# Patient Record
Sex: FEMALE | Race: WHITE | Employment: FULL TIME | URBAN - NONMETROPOLITAN AREA
[De-identification: names, ages, dates, MRNs, and addresses within clinical notes are randomized per-mention and may not be internally consistent; named-entity substitution may affect disease eponyms.]

---

## 2017-09-12 ENCOUNTER — HOSPITAL ENCOUNTER (OUTPATIENT)
Dept: MAMMOGRAPHY | Age: 62
Discharge: HOME | End: 2017-09-12
Payer: COMMERCIAL

## 2017-09-12 DIAGNOSIS — Z12.31 ENCOUNTER FOR MAMMOGRAM TO ESTABLISH BASELINE MAMMOGRAM: ICD-10-CM

## 2017-09-12 PROCEDURE — G0202 SCR MAMMO BI INCL CAD: HCPCS

## 2018-10-30 ENCOUNTER — HOSPITAL ENCOUNTER (OUTPATIENT)
Dept: MAMMOGRAPHY | Age: 63
Discharge: HOME | End: 2018-11-01
Payer: COMMERCIAL

## 2018-10-30 DIAGNOSIS — Z01.419 WELL WOMAN EXAM: ICD-10-CM

## 2018-10-30 PROCEDURE — 77067 SCR MAMMO BI INCL CAD: CPT

## 2019-04-02 ENCOUNTER — HOSPITAL ENCOUNTER (OUTPATIENT)
Dept: LAB | Age: 64
Discharge: HOME | End: 2019-04-02
Payer: COMMERCIAL

## 2019-04-02 LAB
ABSOLUTE EOS #: 0 K/UL (ref 0–0.4)
ABSOLUTE IMMATURE GRANULOCYTE: NORMAL K/UL (ref 0–0.3)
ABSOLUTE LYMPH #: 1.1 K/UL (ref 1–4.8)
ABSOLUTE MONO #: 0.3 K/UL (ref 0.1–1.2)
ALBUMIN SERPL-MCNC: 4.6 G/DL (ref 3.5–5.2)
ALBUMIN/GLOBULIN RATIO: 1.8 (ref 1–2.5)
ALP BLD-CCNC: 63 U/L (ref 35–104)
ALT SERPL-CCNC: 27 U/L (ref 5–33)
ANION GAP SERPL CALCULATED.3IONS-SCNC: 11 MMOL/L (ref 9–17)
AST SERPL-CCNC: 34 U/L
BASOPHILS # BLD: 1 % (ref 0–1)
BASOPHILS ABSOLUTE: 0 K/UL (ref 0–0.2)
BILIRUB SERPL-MCNC: 0.52 MG/DL (ref 0.3–1.2)
BUN BLDV-MCNC: 10 MG/DL (ref 8–23)
BUN/CREAT BLD: 16 (ref 9–20)
CALCIUM SERPL-MCNC: 9.8 MG/DL (ref 8.6–10.4)
CHLORIDE BLD-SCNC: 101 MMOL/L (ref 98–107)
CHOLESTEROL/HDL RATIO: 2
CHOLESTEROL: 222 MG/DL
CO2: 29 MMOL/L (ref 20–31)
CREAT SERPL-MCNC: 0.64 MG/DL (ref 0.5–0.9)
DIFFERENTIAL TYPE: NORMAL
EOSINOPHILS RELATIVE PERCENT: 1 % (ref 1–7)
GFR AFRICAN AMERICAN: >60 ML/MIN
GFR NON-AFRICAN AMERICAN: >60 ML/MIN
GFR SERPL CREATININE-BSD FRML MDRD: ABNORMAL ML/MIN/{1.73_M2}
GFR SERPL CREATININE-BSD FRML MDRD: ABNORMAL ML/MIN/{1.73_M2}
GLUCOSE BLD-MCNC: 98 MG/DL (ref 70–99)
HCT VFR BLD CALC: 41.4 % (ref 36–46)
HDLC SERPL-MCNC: 113 MG/DL
HEMOGLOBIN: 13.7 G/DL (ref 12–16)
IMMATURE GRANULOCYTES: NORMAL %
INR BLD: 1
LDL CHOLESTEROL: 100 MG/DL (ref 0–130)
LYMPHOCYTES # BLD: 29 % (ref 16–46)
MCH RBC QN AUTO: 32.1 PG (ref 26–34)
MCHC RBC AUTO-ENTMCNC: 33.1 G/DL (ref 31–37)
MCV RBC AUTO: 97 FL (ref 80–100)
MONOCYTES # BLD: 7 % (ref 4–11)
NRBC AUTOMATED: NORMAL PER 100 WBC
PARTIAL THROMBOPLASTIN TIME: 28 SEC (ref 27–35)
PDW BLD-RTO: 13.3 % (ref 11–14.5)
PLATELET # BLD: 232 K/UL (ref 140–450)
PLATELET ESTIMATE: NORMAL
PMV BLD AUTO: 8.5 FL (ref 6–12)
POTASSIUM SERPL-SCNC: 4.1 MMOL/L (ref 3.7–5.3)
PROTHROMBIN TIME: 10.2 SEC (ref 9.4–11.3)
RBC # BLD: 4.27 M/UL (ref 4–5.2)
RBC # BLD: NORMAL 10*6/UL
SEG NEUTROPHILS: 62 % (ref 43–77)
SEGMENTED NEUTROPHILS ABSOLUTE COUNT: 2.3 K/UL (ref 1.8–7.7)
SODIUM BLD-SCNC: 141 MMOL/L (ref 135–144)
THYROXINE, FREE: 0.95 NG/DL (ref 0.93–1.7)
TOTAL PROTEIN: 7.2 G/DL (ref 6.4–8.3)
TRIGL SERPL-MCNC: 47 MG/DL
TSH SERPL DL<=0.05 MIU/L-ACNC: 1.83 MIU/L (ref 0.3–5)
VLDLC SERPL CALC-MCNC: ABNORMAL MG/DL (ref 1–30)
WBC # BLD: 3.7 K/UL (ref 3.5–11)
WBC # BLD: NORMAL 10*3/UL

## 2019-04-02 PROCEDURE — 84439 ASSAY OF FREE THYROXINE: CPT

## 2019-04-02 PROCEDURE — 85025 COMPLETE CBC W/AUTO DIFF WBC: CPT

## 2019-04-02 PROCEDURE — 36415 COLL VENOUS BLD VENIPUNCTURE: CPT

## 2019-04-02 PROCEDURE — 85730 THROMBOPLASTIN TIME PARTIAL: CPT

## 2019-04-02 PROCEDURE — 80053 COMPREHEN METABOLIC PANEL: CPT

## 2019-04-02 PROCEDURE — 84443 ASSAY THYROID STIM HORMONE: CPT

## 2019-04-02 PROCEDURE — 85610 PROTHROMBIN TIME: CPT

## 2019-04-02 PROCEDURE — 80061 LIPID PANEL: CPT

## 2020-06-22 ENCOUNTER — OFFICE VISIT (OUTPATIENT)
Dept: DERMATOLOGY | Age: 65
End: 2020-06-22
Payer: COMMERCIAL

## 2020-06-22 ENCOUNTER — HOSPITAL ENCOUNTER (OUTPATIENT)
Age: 65
Setting detail: SPECIMEN
Discharge: HOME | End: 2020-06-22
Payer: COMMERCIAL

## 2020-06-22 VITALS
WEIGHT: 110 LBS | DIASTOLIC BLOOD PRESSURE: 80 MMHG | SYSTOLIC BLOOD PRESSURE: 136 MMHG | HEIGHT: 65 IN | BODY MASS INDEX: 18.33 KG/M2

## 2020-06-22 PROCEDURE — 11301 SHAVE SKIN LESION 0.6-1.0 CM: CPT | Performed by: DERMATOLOGY

## 2020-06-22 PROCEDURE — 99202 OFFICE O/P NEW SF 15 MIN: CPT | Performed by: DERMATOLOGY

## 2020-06-22 PROCEDURE — 11102 TANGNTL BX SKIN SINGLE LES: CPT | Performed by: DERMATOLOGY

## 2020-06-22 RX ORDER — AMLODIPINE BESYLATE 5 MG/1
5 TABLET ORAL DAILY
COMMUNITY
Start: 2019-03-29 | End: 2021-06-28

## 2020-06-22 NOTE — PROGRESS NOTES
Exam:  General Appearance: No acute distress, Well nourished     Neuro: Alert and oriented to person, place and time  Psych: Normal affect   Lymph Node: Not performed    Cutaneous Exam: Performed as documented in clinic note below. Total body skin exam, including head/face, neck, both arms, chest, back, abdomen, both legs, buttocks, digits and/or nails, was examined. Genital exam was deferred as patient denied having any lesions in this area. Pertinent Physical Exam Findings:  Physical Exam  Left chest with pink papule  Right flank with 7 mm Crusted tan to brown stuck-on papule with excoriation   Scattered tan to brown homogenous macules and thin papules with regular borders on the face, trunk and extremities  Crusted tan to brown stuck-on papules on trunk, ext, face    Photo surveillance performed: Yes    Medical Necessity of Exam Performed:   Distribution of patient concerns    Additional Diagnostic Testing performed during exam: Not performed ,  Not performed    ASSESSMENT:   Diagnosis Orders   1. Inflamed seborrheic keratosis     2. Neoplasm of uncertain behavior of skin     3. Seborrheic keratoses     4. Multiple benign nevi     5. Hx of nonmelanoma skin cancer         Plan of Action is as Follows:  Assessment   1. Inflamed seborrheic keratosis, right flank  - too large for cryotherapy  Shave Removal: The procedure and its risks were explained including but not limited to pain, bleeding, infection, permanent scar, permanent pigment alteration and need for an additional procedure. Consent to proceed with the procedure was obtained from the patient or the parent. After cleaning with alcohol the 7 mm lesion was anesthetized with 1% lidocaine with epinephrine and was removed with a dermablade. Hemostasis was achieved with aluminum chloride and Vaseline and a bandage were applied.     2. BCC vs. BLK vs. Nevus, left chest  Shave Biopsy: The procedure and its risks were explained including but not limited to pain, [phone]. Follow-up: No follow-ups on file. This note was created with the assistance of a speech-recognition program.  Although the intention is to generate a document that actually reflects the content of the visit, no guarantees can be provided that every mistake has been identified and corrected byediting.     Electronically signed by Jaime Carey MD on 6/22/20 at 9:38 AM EDT

## 2020-06-23 ENCOUNTER — HOSPITAL ENCOUNTER (OUTPATIENT)
Dept: BONE DENSITY | Age: 65
Discharge: HOME | End: 2020-06-25
Payer: COMMERCIAL

## 2020-06-23 ENCOUNTER — HOSPITAL ENCOUNTER (OUTPATIENT)
Dept: MAMMOGRAPHY | Age: 65
Discharge: HOME | End: 2020-06-25
Payer: COMMERCIAL

## 2020-06-23 PROCEDURE — 77085 DXA BONE DENSITY AXL VRT FX: CPT

## 2020-06-23 PROCEDURE — 77063 BREAST TOMOSYNTHESIS BI: CPT

## 2020-06-24 LAB — DERMATOLOGY PATHOLOGY REPORT: NORMAL

## 2021-03-30 ENCOUNTER — IMMUNIZATION (OUTPATIENT)
Dept: LAB | Age: 66
End: 2021-03-30
Payer: COMMERCIAL

## 2021-03-30 PROCEDURE — 0011A COVID-19, MODERNA VACCINE 100MCG/0.5ML DOSE: CPT | Performed by: INTERNAL MEDICINE

## 2021-03-30 PROCEDURE — 91301 COVID-19, MODERNA VACCINE 100MCG/0.5ML DOSE: CPT | Performed by: INTERNAL MEDICINE

## 2021-04-27 ENCOUNTER — IMMUNIZATION (OUTPATIENT)
Dept: LAB | Age: 66
End: 2021-04-27
Payer: COMMERCIAL

## 2021-04-27 PROCEDURE — 0012A COVID-19, MODERNA VACCINE 100MCG/0.5ML DOSE: CPT | Performed by: INTERNAL MEDICINE

## 2021-04-27 PROCEDURE — 91301 COVID-19, MODERNA VACCINE 100MCG/0.5ML DOSE: CPT | Performed by: INTERNAL MEDICINE

## 2021-06-28 ENCOUNTER — OFFICE VISIT (OUTPATIENT)
Dept: DERMATOLOGY | Age: 66
End: 2021-06-28
Payer: COMMERCIAL

## 2021-06-28 VITALS
BODY MASS INDEX: 18.22 KG/M2 | HEIGHT: 65 IN | SYSTOLIC BLOOD PRESSURE: 116 MMHG | HEART RATE: 60 BPM | DIASTOLIC BLOOD PRESSURE: 70 MMHG | WEIGHT: 109.38 LBS

## 2021-06-28 DIAGNOSIS — L82.1 SEBORRHEIC KERATOSES: Primary | ICD-10-CM

## 2021-06-28 DIAGNOSIS — Z85.828 HISTORY OF NONMELANOMA SKIN CANCER: ICD-10-CM

## 2021-06-28 DIAGNOSIS — L57.8 ACTINIC SKIN DAMAGE: ICD-10-CM

## 2021-06-28 DIAGNOSIS — D22.9 MULTIPLE NEVI: ICD-10-CM

## 2021-06-28 PROCEDURE — 99213 OFFICE O/P EST LOW 20 MIN: CPT | Performed by: DERMATOLOGY

## 2021-06-28 NOTE — PROGRESS NOTES
Dermatology Patient Note  [facility] [address]  [address]  Dept: [phone]  Dept Fax: [phone]: [phone]      VISITDATE: 6/28/2021   REFERRING PROVIDER: No ref. provider found      Jonnie Vidal is a 63 y.o. female  who presents today in the office for:    Seborrheic Keratosis (1 year check )      PERTINENT HISTORY NOT LISTED ABOVE:  Patient with h/o NMSC presents for f/u  Few lesions to look at but nothing concerning    MEDICAL PROBLEMS:  Patient Active Problem List    Diagnosis Date Noted    History of nonmelanoma skin cancer 06/28/2021     - reports having skin cancers removed by mohs surgery, unsure type, many years ago         CURRENT MEDICATIONS:   Current Outpatient Medications   Medication Sig Dispense Refill    Calcium Carbonate (CALCIUM 600 PO) Take by mouth 2 times daily       No current facility-administered medications for this visit. ALLERGIES:   No Known Allergies    SOCIAL HISTORY:  Social History     Tobacco Use    Smoking status: Never Smoker    Smokeless tobacco: Never Used   Substance Use Topics    Alcohol use: Yes     Comment: 1 glass of wine at night       Pertinent ROS:  Review of Systems  Skin: Denies any new changing, growing or bleeding lesions or rashes except as described in the HPI   Constitutional: Denies fevers, chills, and malaise. PHYSICAL EXAM:   /70 (Site: Left Upper Arm, Position: Sitting)   Pulse 60   Ht 5' 5\" (1.651 m)   Wt 109 lb 6 oz (49.6 kg)   BMI 18.20 kg/m²     The patient is generally well appearing, well nourished, alert and conversational. Affect is normal.    Cutaneous Exam:  Physical Exam  Total body skin exam, including head/face, neck, both arms, chest, back, abdomen, both legs, buttocks, digits and/or nails, was examined. Genital exam was deferred as patient denied having any lesions in this area.     Diagnoses/exam findings/medical history pertinent to this visit are listed below:    Assessment and Plan:  Assessment   1. History of nonmelanoma skin cancer  - NER    2. Seborrheic keratoses  reassurance and education    3. Multiple nevi  - Clinically and dermatoscopically benign on exam today. - Common nevi have a low individual risk of developing into melanoma. Patients with >50 nevi have a greater risk of developing melanoma in their lifetime and should undergo skin checks at least annually. - I recommended the patient apply broad spectrum spf 30+ sunscreen daily, reapplying every 2 hours  - In additional to regular use of sunscreen, I recommended broad-rimmed hats, long sleeves, and seeking the shade. 4. Actinic skin damage  - patient was counseled that UV-damaged skin increases lifetime risk for skin cancer  - I recommended the patient apply broad spectrum spf 30+ sunscreen daily, reapplying every 2 hours. - In additional to regular use of sunscreen, I recommended broad-rimmed hats, long sleeves, and seeking the shade. RTC 1 year  No future appointments. There are no Patient Instructions on file for this visit. This note was created with the assistance of a speech-recognition program.  Although the intention is to generate a document that actually reflects the content of the visit, no guarantees can be provided that every mistake has been identified and corrected byediting.     Electronically signed by Marty Moreno MD on 6/28/21 at 9:26 AM SUET

## 2021-11-02 ENCOUNTER — HOSPITAL ENCOUNTER (OUTPATIENT)
Dept: MAMMOGRAPHY | Age: 66
Discharge: HOME | End: 2021-11-04
Payer: COMMERCIAL

## 2021-11-02 VITALS — BODY MASS INDEX: 18.44 KG/M2 | HEIGHT: 64 IN | WEIGHT: 108 LBS

## 2021-11-02 DIAGNOSIS — Z12.31 OTHER SCREENING MAMMOGRAM: ICD-10-CM

## 2021-11-02 PROCEDURE — 77063 BREAST TOMOSYNTHESIS BI: CPT
